# Patient Record
Sex: MALE | Race: WHITE | Employment: OTHER | URBAN - METROPOLITAN AREA
[De-identification: names, ages, dates, MRNs, and addresses within clinical notes are randomized per-mention and may not be internally consistent; named-entity substitution may affect disease eponyms.]

---

## 2018-10-09 ENCOUNTER — OFFICE VISIT (OUTPATIENT)
Dept: PHYSICAL MEDICINE AND REHAB | Age: 73
End: 2018-10-09
Payer: MEDICARE

## 2018-10-09 VITALS — HEIGHT: 72 IN | BODY MASS INDEX: 29.53 KG/M2 | WEIGHT: 218 LBS

## 2018-10-09 DIAGNOSIS — R20.2 NUMBNESS AND TINGLING OF BOTH LOWER EXTREMITIES: ICD-10-CM

## 2018-10-09 DIAGNOSIS — R20.0 NUMBNESS AND TINGLING OF BOTH LOWER EXTREMITIES: ICD-10-CM

## 2018-10-09 DIAGNOSIS — R29.898 WEAKNESS OF BOTH LOWER EXTREMITIES: ICD-10-CM

## 2018-10-09 DIAGNOSIS — M54.50 ACUTE BILATERAL LOW BACK PAIN WITHOUT SCIATICA: ICD-10-CM

## 2018-10-09 DIAGNOSIS — M54.50 ACUTE BILATERAL LOW BACK PAIN WITHOUT SCIATICA: Primary | ICD-10-CM

## 2018-10-09 PROCEDURE — 4040F PNEUMOC VAC/ADMIN/RCVD: CPT | Performed by: PHYSICAL MEDICINE & REHABILITATION

## 2018-10-09 PROCEDURE — 95886 MUSC TEST DONE W/N TEST COMP: CPT | Performed by: PHYSICAL MEDICINE & REHABILITATION

## 2018-10-09 PROCEDURE — 1123F ACP DISCUSS/DSCN MKR DOCD: CPT | Performed by: PHYSICAL MEDICINE & REHABILITATION

## 2018-10-09 PROCEDURE — 3017F COLORECTAL CA SCREEN DOC REV: CPT | Performed by: PHYSICAL MEDICINE & REHABILITATION

## 2018-10-09 PROCEDURE — 95910 NRV CNDJ TEST 7-8 STUDIES: CPT | Performed by: PHYSICAL MEDICINE & REHABILITATION

## 2018-10-09 PROCEDURE — G8419 CALC BMI OUT NRM PARAM NOF/U: HCPCS | Performed by: PHYSICAL MEDICINE & REHABILITATION

## 2018-10-09 PROCEDURE — 99202 OFFICE O/P NEW SF 15 MIN: CPT | Performed by: PHYSICAL MEDICINE & REHABILITATION

## 2018-10-09 PROCEDURE — G8484 FLU IMMUNIZE NO ADMIN: HCPCS | Performed by: PHYSICAL MEDICINE & REHABILITATION

## 2018-10-09 PROCEDURE — 4004F PT TOBACCO SCREEN RCVD TLK: CPT | Performed by: PHYSICAL MEDICINE & REHABILITATION

## 2018-10-09 PROCEDURE — 1101F PT FALLS ASSESS-DOCD LE1/YR: CPT | Performed by: PHYSICAL MEDICINE & REHABILITATION

## 2018-10-09 PROCEDURE — G8427 DOCREV CUR MEDS BY ELIG CLIN: HCPCS | Performed by: PHYSICAL MEDICINE & REHABILITATION

## 2018-10-09 RX ORDER — TAMSULOSIN HYDROCHLORIDE 0.4 MG/1
1 CAPSULE ORAL
COMMUNITY
Start: 2018-08-21 | End: 2018-10-09

## 2018-10-09 RX ORDER — OMEPRAZOLE 20 MG/1
1 CAPSULE, DELAYED RELEASE ORAL
COMMUNITY
Start: 2018-08-21

## 2018-10-09 RX ORDER — SILDENAFIL CITRATE 20 MG/1
TABLET ORAL
Status: ON HOLD | COMMUNITY
End: 2021-04-19

## 2018-10-09 RX ORDER — TAMSULOSIN HYDROCHLORIDE 0.4 MG/1
CAPSULE ORAL
Status: ON HOLD | COMMUNITY
End: 2021-04-19

## 2018-10-09 RX ORDER — IBUPROFEN 600 MG/1
TABLET ORAL
Status: ON HOLD | COMMUNITY
Start: 1998-03-20 | End: 2021-04-19

## 2018-10-09 RX ORDER — ATENOLOL 50 MG/1
50 TABLET ORAL 3 TIMES DAILY
COMMUNITY
Start: 2018-07-23

## 2018-10-09 NOTE — PROGRESS NOTES
Above Knee EDB 14.38 2.2 Above Knee - Fib head 10 44 32.5   L Peroneal - EDB      Ankle EDB 4.17 1.7* Ankle - EDB 8  32.1      Fib head EDB 13.02 0.7* Fib head - Ankle 37.5 42 32.2      Above Knee EDB 15.42 0.7* Above Knee - Fib head 10 42 32.2   R Tibial - AH      Ankle AH 4.43 7.1 Ankle - AH 8  32.4      Pop fossa AH 14.32 4.4 Pop fossa - Ankle 45 45 32.4   L Tibial - AH      Ankle AH 3.96 8.2 Ankle - AH 8  32.1      Pop fossa AH 14.11 5.6 Pop fossa - Ankle 42 41 32.1       F  Wave      Nerve Fmin % F    ms %   R Peroneal - EDB 54.58 20   R Tibial - AH 53.80 50   L Peroneal - EDB 58.80 10   L Tibial - AH 55.42 50       H Reflex      Nerve H Lat    ms   L Tibial - [place] 33.91   R Tibial - [place] 35.73     EMG      EMG Summary Table     Spontaneous MUAP Recruitment   Muscle Nerve Roots IA Fib PSW Fasc Amp Dur. PPP Pattern   L. Vastus medialis Femoral L2-L4 N None None None N N N N   L. Tibialis anterior Deep peroneal (Fibular) L4-L5 N None None None 2+ 2+ 3+ Discrete   L. Gastrocnemius (Medial head) Tibial S1-S2 N None None None N N N N   L. Extensor hallucis longus Deep peroneal (Fibular) L5-S1 N None None None 1+ 1+ 1+ Reduced   L. Lumbar paraspinals (low)  - N None None None N N N N   L. Lumbar paraspinals (mid)  - N None None None N N N N   L. Gluteus medius Superior gluteal L4-S1 N None None None 1+ 1+ 1+ Reduced   L. Gluteus francine Inferior gluteal L5-S2 N None None None N N N N          Pain was assessed/reassessed during EMG and managed with appropriate intervention throughout the entire procedure. The patient was instructed in post procedure care. Technical Considerations:  Obesity  No, Poor tolerance to test No, Skin callous No, Skin breakdown No, Edema No    Summary of Findings:   Nerve conduction studies:   · The following nerve conduction studies were abnormal:   · The peroneal motor study recording the extensor digitorum brevis was small in amplitude compared to the right.    · All other nerve conduction studies listed in the table above were normal in latency, amplitude and conduction velocity. Needle EMG:   ·  Needle EMG was performed using a concentric needle. · The following abnormalities were seen on needle EMG: Reduced recruitment of motor units with chronic changes of increased amplitude, duration and phases were seen in the left extensor hallucis longus, tibialis anterior and gluteus medius. All other muscles tested, as listed in the table above demonstrated normal  amplitude, duration, phases and recruitment and no active denervation signs were seen. Diagnostic Interpretation: This study was abnormal.     Electrodiagnosis: There is electrodiagnostic evidence of a left L5 radiculopathy. · Location: left    · Nature: [ X ] Axonal   [  ] Demyelinating  [  ] Mixed axonal and demyelinating     [  ] Sensory [ X ] Motor               [  ] Mixed sensorimotor     [  ] with active denervation       [email or web address]  ] without active denervation  · Duration: Chronic  · Severity: moderate  · Prognosis: Poor. The prognosis for recovery of axonal lesions is poor and dependant on collateral sprouting and reinnervation. Probable clinical diagnosis: Chronic left L5 radiculopathy    Comparison to prior EMG: There is not a prior EMG for comparison. Follow up EMG is recommended if clinically warranted. Thank you for the consultation and for allowing me to participate in the care of your patient. Technologist: Ann Rogers LPN, CNCT   Physician:    Padmini Arcos D.O., P.T. Board Certified Physical Medicine and Rehabilitation  Board Certified Electrodiagnostic Medicine      Normal nerve Conduction Values    For sensory nerve conduction studies, the amplitude is measured peak-to-peak, the latency reported is the distal peak latency, and the conduction velocity, if measured, is determined from the onset latencies and is over the forearm or leg respectively.       For motor nerve conduction studies, the amplitude is measured baseline-to-peak, the latency reported is the distal onset latency, the conduction velocity is calculated over the forearm or leg respectively. F waves are reported as minimal latency and are performed as follows: recording the abductor pollicis brevis stimulating the median nerve at the wrist, recording the abductor digiti minimi stimulating the ulnar nerve at the wrist, recording at the extensor digitorum brevis stimulating the peroneal nerve at the ankle and recording the abductor hallucis stimulating tibial at the medial malleolus. For H-Reflexes the site of stimulation is in the popliteal fossa and the recording site is the gastrocnemius. For F waves, the site of stimulation is at the wrist or ankle respectively and the muscle recorded is the same as that used for the compound motor unit action potentials as described in the table.           Sensory Nerves Peak to Peak  Amplitude  (mV) Peak Latency (ms)   Superficial Radial Sensory Antidromic (10cm) 11 2.8    Median Sensory Antidromic Dig II   Palm (7cm)  Wrist (14 cm)  Age 19-49 BMI <24  Age 47-78 BMI <24  Age 20-48 BMI >/= 24  Age 47-78 BMI >/= 24   8  13  19  15  13  8   2.3  4     Ulnar Sensory Antidromic Dig V (14 cm)  Age 20-48 BMI <24  Age 47-78 BMI <24  Age 20-48 BMI >/= 24  Age 47-78 BMI >/= 24 9  13  13  8  4 4   Medial Antebrachial cutaneous Sensory Antidromic (10 cm)   3 2.6   Lateral Antebrachial cutaneous Sensory Antidromic (10 cm) 6 2.5   Sural Sensory Antidromic (14 cm) 4 4.5     Medial and lateral Plantars (14 cm)   Compare side to side <3.8     Superficial peroneal sensory (10 cm)  Age <9  Age 7-34  Age 35-46  Age 52-63  Age >57   >6  >6  >5  >4  >3   <4.3  <4.4  <4.5  <4.6  <4.6     Saphenous sensory (12 cm)  Age <9  Age 7-34  Age 35-46  Age 52-63  Age >57   >6  >6  >4  >4  >3   <4.3  <4.4  <4.5  <4.6  <4.6     Dorsal ulnar sensory (8 cm)  Age <9  Age 7-34  Age 35-46  Age 52-63  Age >57 >18  >18  >12  >10  >5   <2.9  <3  <3.1  <3.1  <3.2         Study Latency difference (ms)   Median compared to (minus) ulnar motor comparison  All ages  Age 20-48  Age 47-78   1.5  1.4  1.7   Median to Ulnar comparison (second lumbrical/interossei)  0.4     Combined Sensory Index:   Study Latency Difference (ms)</=   Median to Ulnar Palmar Orthodromic mixed nerve comparison 0.3   Median to Ulnar sensory Ring finger comparison 0.4       Median: Radial sensory digit 1 comparison 0.5     Combined Sensory Index (CSI)  0.9       Motor Nerves Baseline to Peak Amplitude  (mV) Conduction Velocity (m/s) Distal Latency (ms)   Median motor APB  All ages  Men Age23 to 44  Men Age 36 to 52  Men Age 48 to 61  Men Age 61 to 71  Men age 79 to 78  Women Age 23 to 44  Women Age 36 to 52  Women Age 48 to 61  Women Age 61 to 71  Women Age 79 to 78   4.1  5.9  4.2   4.2   3.8  3.8  5.9  4.2  4.2  3.8  3.8   49  49  47  47  47  47  53  51  51  51  51   4.5  4.6  4.6  4.7  4.7  4.7  4.4  4.4  4.4  4.4  4.4   Ulnar motor ADM  All ages  Below elbow  Across elbow  Above elbow  CV drop across elbow  % CV drop across elbow   7.9     52  43  50  15 m/s  23%   3.7   Fibular (peroneal) motor EDB  All ages  Age 23 to 44 <170 cm tall   Age 23 to 44 >170 cm tall  Age 36 to 78 <170 cm tall  Age 36 to 78 >170 cm tall  CV across fibular head  CV drop across fibular head  % CV drop across fibular head  % amplitude drop ankle to below fibula  % amplitude drop across fibular head   1.3  2.6  2.6  1.1  1.1        32%  25%   38  43  37  39  36  42  6m/s  12%     6.5  6.5  6.5  6.5  6.5   Tibial motor AH  All ages  Age 23 to 34 <160 cm tall  Age 30-49 <160 cm tall  Age 48 to 61 <160 cm tall  Age 61 to 78 <160 cm tall  Age 23 to 34, 160-170 cm tall  Age 30-49 160-170 cm tall  Age 48 to 61 160-170 cm tall  Age 61 to 78 160-170 cm tall  Age 23 to 34 >/=170 cm tall  Age 30-49 >/=170 cm tall  Age 48 to 61 >/=170 cm tall  Age 61 to 78 >/=170 cm tall  Amplitude

## 2018-10-09 NOTE — PROGRESS NOTES
Date of Examination: 10/09/18  Patient Name: Arminda Roblero  is a 72y.o. year old male who was seen due to complaints of   Chief Complaint   Patient presents with    Back Pain     Right Low back pain without radiation     Numbness     Left foot numbness    Extremity Weakness     Intermittent weakness in the lower extremities    that has been present for many years and started after lumbar spine surgery. Physical Exam: MSK: There is no joint effusion, deformity, instability, swelling, erythema or warmth. AROM is full in the spine and extremities. Negative SLR. Neurologic:  4/5 left ankle dorsiflexion and great toe extension, decreased light touch sensation left dorsal foot, otherwise, no focal sensorimotor deficit. Reflexes 2+ and symmetric. Gait is normal.    Impression:   1. Acute bilateral low back pain without sciatica    2. Weakness of both lower extremities    3. Numbness and tingling of both lower extremities        Plan:   · EMG is indicated to evaluate the above diagnosis. Orders Placed This Encounter   Procedures    EMG     Standing Status:   Future     Standing Expiration Date:   10/9/2019     · EMG was done today and showed a chronic left L5 radiculopathy. The patient was educated about the diagnosis and the prognosis. · Advised patient to follow up with referring provider. Thank you for allowing me to participate in the care of your patient.       Sincerely,     Sherry Calixto

## 2021-04-19 ENCOUNTER — HOSPITAL ENCOUNTER (INPATIENT)
Age: 76
LOS: 1 days | Discharge: HOME | DRG: 069 | End: 2021-04-20
Attending: INTERNAL MEDICINE | Admitting: INTERNAL MEDICINE
Payer: MEDICARE

## 2021-04-19 PROBLEM — G93.40 ACUTE ENCEPHALOPATHY: Status: ACTIVE | Noted: 2021-04-19

## 2021-04-19 PROCEDURE — G0379 DIRECT REFER HOSPITAL OBSERV: HCPCS

## 2021-04-19 PROCEDURE — G0378 HOSPITAL OBSERVATION PER HR: HCPCS

## 2021-04-19 RX ORDER — NAPROXEN 500 MG/1
500 TABLET ORAL 2 TIMES DAILY WITH MEALS
COMMUNITY

## 2021-04-19 RX ORDER — ROSUVASTATIN CALCIUM 5 MG/1
5 TABLET, COATED ORAL DAILY
COMMUNITY

## 2021-04-19 ASSESSMENT — PAIN SCALES - GENERAL: PAINLEVEL_OUTOF10: 0

## 2021-04-20 ENCOUNTER — APPOINTMENT (OUTPATIENT)
Dept: ULTRASOUND IMAGING | Age: 76
DRG: 069 | End: 2021-04-20
Attending: INTERNAL MEDICINE
Payer: MEDICARE

## 2021-04-20 VITALS
DIASTOLIC BLOOD PRESSURE: 82 MMHG | TEMPERATURE: 97.5 F | HEART RATE: 60 BPM | RESPIRATION RATE: 18 BRPM | OXYGEN SATURATION: 96 % | SYSTOLIC BLOOD PRESSURE: 147 MMHG | HEIGHT: 72 IN | BODY MASS INDEX: 29.57 KG/M2

## 2021-04-20 LAB
CHOLESTEROL, TOTAL: 202 MG/DL (ref 0–199)
HBA1C MFR BLD: 5.3 % (ref 4–5.6)
HCT VFR BLD CALC: 40.1 % (ref 37–54)
HDLC SERPL-MCNC: 67 MG/DL
HEMOGLOBIN: 13.5 G/DL (ref 12.5–16.5)
LDL CHOLESTEROL CALCULATED: 115 MG/DL (ref 0–99)
MCH RBC QN AUTO: 32 PG (ref 26–35)
MCHC RBC AUTO-ENTMCNC: 33.7 % (ref 32–34.5)
MCV RBC AUTO: 95 FL (ref 80–99.9)
PDW BLD-RTO: 12.7 FL (ref 11.5–15)
PLATELET # BLD: 211 E9/L (ref 130–450)
PMV BLD AUTO: 10 FL (ref 7–12)
RBC # BLD: 4.22 E12/L (ref 3.8–5.8)
TRIGL SERPL-MCNC: 99 MG/DL (ref 0–149)
TROPONIN: <0.01 NG/ML (ref 0–0.03)
TROPONIN: <0.01 NG/ML (ref 0–0.03)
VLDLC SERPL CALC-MCNC: 20 MG/DL
WBC # BLD: 6.8 E9/L (ref 4.5–11.5)

## 2021-04-20 PROCEDURE — 85027 COMPLETE CBC AUTOMATED: CPT

## 2021-04-20 PROCEDURE — 2060000000 HC ICU INTERMEDIATE R&B

## 2021-04-20 PROCEDURE — 80061 LIPID PANEL: CPT

## 2021-04-20 PROCEDURE — 84484 ASSAY OF TROPONIN QUANT: CPT

## 2021-04-20 PROCEDURE — 93880 EXTRACRANIAL BILAT STUDY: CPT

## 2021-04-20 PROCEDURE — 6360000002 HC RX W HCPCS: Performed by: FAMILY MEDICINE

## 2021-04-20 PROCEDURE — 92610 EVALUATE SWALLOWING FUNCTION: CPT

## 2021-04-20 PROCEDURE — 2580000003 HC RX 258: Performed by: FAMILY MEDICINE

## 2021-04-20 PROCEDURE — 92523 SPEECH SOUND LANG COMPREHEN: CPT

## 2021-04-20 PROCEDURE — 93880 EXTRACRANIAL BILAT STUDY: CPT | Performed by: RADIOLOGY

## 2021-04-20 PROCEDURE — 83036 HEMOGLOBIN GLYCOSYLATED A1C: CPT

## 2021-04-20 PROCEDURE — 97161 PT EVAL LOW COMPLEX 20 MIN: CPT

## 2021-04-20 PROCEDURE — 6370000000 HC RX 637 (ALT 250 FOR IP): Performed by: FAMILY MEDICINE

## 2021-04-20 PROCEDURE — 36415 COLL VENOUS BLD VENIPUNCTURE: CPT

## 2021-04-20 PROCEDURE — 96372 THER/PROPH/DIAG INJ SC/IM: CPT

## 2021-04-20 RX ORDER — LORAZEPAM 2 MG/ML
3 INJECTION INTRAMUSCULAR
Status: DISCONTINUED | OUTPATIENT
Start: 2021-04-20 | End: 2021-04-21 | Stop reason: HOSPADM

## 2021-04-20 RX ORDER — ASPIRIN 81 MG/1
81 TABLET ORAL DAILY
Status: DISCONTINUED | OUTPATIENT
Start: 2021-04-20 | End: 2021-04-21 | Stop reason: HOSPADM

## 2021-04-20 RX ORDER — SODIUM CHLORIDE 0.9 % (FLUSH) 0.9 %
5-40 SYRINGE (ML) INJECTION EVERY 12 HOURS SCHEDULED
Status: DISCONTINUED | OUTPATIENT
Start: 2021-04-20 | End: 2021-04-21 | Stop reason: HOSPADM

## 2021-04-20 RX ORDER — SODIUM CHLORIDE 0.9 % (FLUSH) 0.9 %
5-40 SYRINGE (ML) INJECTION PRN
Status: DISCONTINUED | OUTPATIENT
Start: 2021-04-20 | End: 2021-04-21 | Stop reason: HOSPADM

## 2021-04-20 RX ORDER — LORAZEPAM 2 MG/ML
2 INJECTION INTRAMUSCULAR
Status: DISCONTINUED | OUTPATIENT
Start: 2021-04-20 | End: 2021-04-21 | Stop reason: HOSPADM

## 2021-04-20 RX ORDER — LORAZEPAM 1 MG/1
3 TABLET ORAL
Status: DISCONTINUED | OUTPATIENT
Start: 2021-04-20 | End: 2021-04-21 | Stop reason: HOSPADM

## 2021-04-20 RX ORDER — POLYETHYLENE GLYCOL 3350 17 G/17G
17 POWDER, FOR SOLUTION ORAL DAILY PRN
Status: DISCONTINUED | OUTPATIENT
Start: 2021-04-20 | End: 2021-04-21 | Stop reason: HOSPADM

## 2021-04-20 RX ORDER — SODIUM CHLORIDE 9 MG/ML
25 INJECTION, SOLUTION INTRAVENOUS PRN
Status: DISCONTINUED | OUTPATIENT
Start: 2021-04-20 | End: 2021-04-21 | Stop reason: HOSPADM

## 2021-04-20 RX ORDER — LORAZEPAM 2 MG/ML
4 INJECTION INTRAMUSCULAR
Status: DISCONTINUED | OUTPATIENT
Start: 2021-04-20 | End: 2021-04-21 | Stop reason: HOSPADM

## 2021-04-20 RX ORDER — ASPIRIN 81 MG/1
81 TABLET ORAL DAILY
COMMUNITY

## 2021-04-20 RX ORDER — LORAZEPAM 1 MG/1
4 TABLET ORAL
Status: DISCONTINUED | OUTPATIENT
Start: 2021-04-20 | End: 2021-04-21 | Stop reason: HOSPADM

## 2021-04-20 RX ORDER — ROSUVASTATIN CALCIUM 5 MG/1
5 TABLET, COATED ORAL DAILY
Status: DISCONTINUED | OUTPATIENT
Start: 2021-04-20 | End: 2021-04-21 | Stop reason: HOSPADM

## 2021-04-20 RX ORDER — ONDANSETRON 2 MG/ML
4 INJECTION INTRAMUSCULAR; INTRAVENOUS EVERY 6 HOURS PRN
Status: DISCONTINUED | OUTPATIENT
Start: 2021-04-20 | End: 2021-04-21 | Stop reason: HOSPADM

## 2021-04-20 RX ORDER — LORAZEPAM 1 MG/1
1 TABLET ORAL
Status: DISCONTINUED | OUTPATIENT
Start: 2021-04-20 | End: 2021-04-21 | Stop reason: HOSPADM

## 2021-04-20 RX ORDER — ATENOLOL 50 MG/1
50 TABLET ORAL DAILY
Status: DISCONTINUED | OUTPATIENT
Start: 2021-04-20 | End: 2021-04-21 | Stop reason: HOSPADM

## 2021-04-20 RX ORDER — PROMETHAZINE HYDROCHLORIDE 25 MG/1
12.5 TABLET ORAL EVERY 6 HOURS PRN
Status: DISCONTINUED | OUTPATIENT
Start: 2021-04-20 | End: 2021-04-21 | Stop reason: HOSPADM

## 2021-04-20 RX ORDER — ASPIRIN 300 MG/1
300 SUPPOSITORY RECTAL DAILY
Status: DISCONTINUED | OUTPATIENT
Start: 2021-04-20 | End: 2021-04-21 | Stop reason: HOSPADM

## 2021-04-20 RX ORDER — LORAZEPAM 1 MG/1
2 TABLET ORAL
Status: DISCONTINUED | OUTPATIENT
Start: 2021-04-20 | End: 2021-04-21 | Stop reason: HOSPADM

## 2021-04-20 RX ORDER — LORAZEPAM 2 MG/ML
1 INJECTION INTRAMUSCULAR
Status: DISCONTINUED | OUTPATIENT
Start: 2021-04-20 | End: 2021-04-21 | Stop reason: HOSPADM

## 2021-04-20 RX ADMIN — ENOXAPARIN SODIUM 40 MG: 40 INJECTION SUBCUTANEOUS at 09:28

## 2021-04-20 RX ADMIN — Medication 10 ML: at 09:00

## 2021-04-20 RX ADMIN — ATENOLOL 50 MG: 50 TABLET ORAL at 09:28

## 2021-04-20 RX ADMIN — ROSUVASTATIN 5 MG: 10 TABLET, FILM COATED ORAL at 09:28

## 2021-04-20 ASSESSMENT — PAIN SCALES - GENERAL
PAINLEVEL_OUTOF10: 0

## 2021-04-20 NOTE — H&P
Hospital Medicine History & Physical      PCP: Nafisa Crouch MD    Date of Admission: 4/19/2021    Date of Service: Pt seen/examined on 4/20/2021  and Admitted to Inpatient with expected LOS greater than two midnights due to medical therapy. Chief Complaint:  AMS       History Of Present Illness:   78 y.o. male with past medical history of hypertension and hyperlipidemia. Patient is a transfer from [facility] ED He presented to the Ed there due to increased confusion and lightheadedness. [facility] and started to developed  dizziness . He states that it felt like the room was spinning. He reports no vision changes. He denies any ear pain . The patient denies slurred speech. He reports no chest pain or shortness of breath  . He reports no fever chills or coughing It was reported by EMS that patient was confused and had difficulty talking . In the ED at [address] head revealed communicating hydrencephalus no acute infract . MRI head revealed possible cortical infarct in left parietal lobe Trop was <0.015 Lactic acid 0.7 creatinine was 0.81 EKG was normal sinus rhythm . Past Medical History:      Hypertension   Hyperlipidemia       Past Surgical History:    Reviewed in chart     Medications Prior to Admission:      Prior to Admission medications    Medication Sig Start Date End Date Taking?  Authorizing Provider   aspirin 81 MG EC tablet Take 81 mg by mouth daily   Yes Historical Provider, MD   rosuvastatin (CRESTOR) 5 MG tablet Take 5 mg by mouth daily   Yes Historical Provider, MD   naproxen (NAPROSYN) 500 MG tablet Take 500 mg by mouth 2 times daily (with meals)   Yes Historical Provider, MD   atenolol (TENORMIN) 50 MG tablet 50 mg 3 times daily  7/23/18  Yes Historical Provider, MD   omeprazole (PRILOSEC) 20 MG delayed release capsule 1 capsule 8/21/18  Yes Historical Provider, MD   Cholecalciferol 2000 units CAPS Vitamin D3 2,000 unit tablet   Take 1 tablet every day by oral route for 90 days. Yes Historical Provider, MD       Allergies:  Patient has no known allergies. Social History:      The patient currently lives with family     TOBACCO:   reports that he has never smoked. His smokeless tobacco use includes snuff. ETOH:   reports current alcohol use of about 52.0 standard drinks of alcohol per week. Family History:       Reviewed in detail and negative for DM, CAD, Cancer, CVA. Positive as follows:    No family history on file. REVIEW OF SYSTEMS:   Pertinent positives as noted in the HPI. All other systems reviewed and negative. PHYSICAL EXAM:    BP (!) 143/80   Pulse 63   Temp 98 °F (36.7 °C) (Oral)   Resp 19   SpO2 97%     General appearance:  No apparent distress, appears stated age and cooperative. HEENT:  Normal cephalic, atraumatic without obvious deformity. Pupils equal, round, and reactive to light. Extra ocular muscles intact. Conjunctivae/corneas clear. Neck: Supple, with full range of motion. No jugular venous distention. Trachea midline. Respiratory:  Normal respiratory effort. Clear to auscultation, bilaterally without Rales/Wheezes/Rhonchi. Cardiovascular:  Regular rate and rhythm with normal S1/S2 without murmurs, rubs or gallops. Abdomen: Soft, non-tender, non-distended with normal bowel sounds. Musculoskeletal:  No clubbing, cyanosis or edema bilaterally. Full range of motion without deformity. Skin: Skin color, texture, turgor normal.  No rashes or lesions. Neurologic:  No slurred speech 5/5 strength in upper land lower extremities  Neurovascularly intact without any focal sensory/motor deficits. Cranial nerves: II-XII intact, grossly non-focal.  Psychiatric:  Alert and oriented x2 , thought content appropriate, normal insight      EKG:  I have reviewed the EKG  Labs:     No results for input(s): WBC, HGB, HCT, PLT in the last 72 hours.   No results for input(s): NA, K, CL, CO2, BUN, CREATININE, CALCIUM, PHOS in the last 72 hours. Invalid input(s): MAGNES  No results for input(s): AST, ALT, BILIDIR, BILITOT, ALKPHOS in the last 72 hours. No results for input(s): INR in the last 72 hours. Recent Labs     04/20/21  0124   TROPONINI <0.01       Urinalysis:    No results found for: NITRU, WBCUA, BACTERIA, RBCUA, BLOODU, SPECGRAV, GLUCOSEU      ASSESSMENT:    Active Hospital Problems    Diagnosis Date Noted    Acute encephalopathy [G93.40] 04/19/2021       PLAN:      Acute Encephalopathy : maybe  likely due to  CVA . MRI revealed small remote cortical infarct in left parietal lobe . CT head revealed communicating hydrocephalus .  Admit inpatient vitals telemetry , NPO till swallow evaluation PT/OT Neurology consult carotid ultrsound trend troponin ASA , Crestor     Hypertension : blood pressure is stable C/w Atenolol     Hyperlipidemia : C/w Crestor            DVT Prophylaxis: Lovenox   Diet: Diet NPO Effective Now  Code Status: Full Code         Lexie Marcial MD

## 2021-04-20 NOTE — PROGRESS NOTES
Patient requested to sign out Against medical advice. Patient was counseled by care team with no success. AMA paper signed and patient left with wife.

## 2021-04-20 NOTE — PROGRESS NOTES
SPEECH/LANGUAGE PATHOLOGY  BEDSIDE SWALLOWING EVALUATION    PATIENT NAME:  Greg Hanks      :  1945          TODAY'S DATE:  2021 ROOM:  [address] [place] [address]      SUMMARY OF EVALUATION     DYSPHAGIA DIAGNOSIS:  Within functional limits      DIET RECOMMENDATIONS: Regular consistency solids with  thin liquids     FEEDING RECOMMENDATIONS:     Assistance level:  no assistance needed      Compensatory strategies recommended:compensatory strategies are not recommended at this time    PLAN OF CARE     Dysphagia therapy is not recommended                  PROCEDURE     Consistencies Administered During the Evaluation   Liquids: Thin   Solids:  Pureed, solids      Method of Intake:   Straw, spoon  Self fed, fed by clinician    Position:   Upright seated                  RESULTS     Oral Stage: The oral stage of swallowing was within functional limits      Pharyngeal Stage:      No signs of aspiration were noted during this evaluation however, silent aspiration cannot be ruled out at bedside. If silent aspiration is suspected, a Videofluoroscopic Study of Swallowing (MBS) is recommended and requires a physician order. The Speech Language Pathologist (SLP) completed education with the patient regarding results of evaluation. Explained that Speech Pathology intervention is not warranted at this time      CPT code:  91169  bedside swallow eval      [x]The admitting diagnosis and active problem list, as listed below have been reviewed prior to initiation of this evaluation.      ADMITTING DIAGNOSIS: Acute encephalopathy [G93.40]     ACTIVE PROBLEM LIST:   Patient Active Problem List   Diagnosis    Acute encephalopathy

## 2021-04-20 NOTE — CARE COORDINATION
Met with patient at bedside to discuss transition of care. Pt lives with his wife in a 2 story home with 2 stairs to enter. Bedroom and bathroom on 2nd floor. No assistive devices. He stated he is independent with ADL's. He was driving pta. PCP . Pt planning to return home at discharge with no anticipated needs. Awaiting Neuro and NS consults. PT [facility] 24/24.  CM to follow    Marlene PEÑAN, RN  [facility] Case Management  [phone]

## 2021-04-20 NOTE — PROGRESS NOTES
SPEECH/LANGUAGE PATHOLOGY  SPEECH/LANGUAGE/COGNITIVE EVALUATION      PATIENT NAME:  Nacho Clements      :  1945          TODAY'S DATE:  2021 ROOM:  [address] [place] [address]      SPEECH PATHOLOGY DIAGNOSIS:   Within Functional Limits    PLAN OF CARE:  Speech Pathology intervention is not warranted at this time. MOTOR SPEECH          Oral Peripheral Examination   Adequate lingual/labial strength       Parameters of Speech Production  Respiration:  Within Function Limits  Articulation:  Within Function Limits  Resonance:  Within Function Limits  Quality:   Within Function Limits  Pitch: Within Function Limits  Intensity: Within Function Limits  Fluency:  Intact  Prosody Intact      RECEPTIVE LANGUAGE       Comprehension of Yes/No Questions: Within Function Limits    Process  Simple Verbal Commands: Within Function Limits  Process Intermediate Verbal Commands: Within Function Limits  Process Complex Verbal Commands: Within Function Limits    Comprehension of Conversation: Within Function Limits      EXPRESSIVE LANGUAGE       Serials: Functional    Imitation:  Words   Functional  Sentences Functional    Naming:  (Modality used: Verbal )   Confrontation Naming Functional  Functional Description Functional  Response Naming: Functional    Conversation:     Grammatical form:  Intact       COGNITION     Attention/Orientation  Attention: Sustained attention  Oriented to:  Person, Place, Date, Reason  for hospitalization    Memory   Biographical: information.   Recalled  Address,  Birthdate, Age,   Family   Delayed recall: 2/3 words    Organization/Problem Solving/Reasoning   Verbal Sequencing:  Functional      Verbal Problem solving:  Functional        CLINICAL OBSERVATIONS NOTED DURING THE EVALUATION    Within Functional Limits                    CPT code:    04692  eval speech sound lang comprehension      [x]The admitting diagnosis and active problem list, as listed below have been reviewed prior to initiation of this evaluation.      ADMITTING DIAGNOSIS: Acute encephalopathy [G93.40]     ACTIVE PROBLEM LIST:   Patient Active Problem List   Diagnosis    Acute encephalopathy

## 2021-04-20 NOTE — PROGRESS NOTES
Physical Therapy    Physical Therapy Initial Assessment     Name: Treva Dye  : 1945  MRN: [id number]    Referring Provider:  Joaqium Godoy MD    Date of Service: 2021    Evaluating PT:  Corina Kam PT, DPT [id number]     Room #:  4020/3394-B  Diagnosis:  Acute encephalopathy   PMHx/PSHx:  No past medical history on file   Precautions:  Low fall risk, [place]  Equipment Needs:  NA    SUBJECTIVE:    Pt lives with his wife in a 2 story home with 2 stairs to enter and 1 rail. Bedroom and bathroom are on the 2nd level. There are 13 steps with B rails to 2nd level. Pt ambulated with no AD, independent, and drives PTA. OBJECTIVE:   Initial Evaluation  Date: 21 Treatment Short Term/ Long Term   Goals   AM-PAC 6 Clicks      Was pt agreeable to Eval/treatment? Yes     Does pt have pain? No c/o pain      Bed Mobility  Rolling: NT  Supine to sit: NT  Sit to supine: NT  Scooting: NT     Transfers Sit to stand: Independent   Stand to sit: Independent   Stand pivot: Independent      Ambulation    200 feet with no AD Independent      Stair negotiation: ascended and descended  12 steps with 1 rail Modified Independent       ROM BUE:  WFL  BLE:  WFL     Strength BUE:  NT  BLE:  5/5     Balance Sitting EOB:  Independent   Dynamic Standing:  Independent        Pt is A & O x 4  Sensation:  Pt denies numbness and tingling to extremities  Edema:  Unremarkable     Vitals:  HR WNL SpO2 WNL    Therapeutic Exercises:     BLE ROM    Patient education  Pt educated on safety during functional mobility, PT role     Patient response to education:   Pt verbalized understanding Pt demonstrated skill Pt requires further education in this area   Yes  Yes  no     ASSESSMENT:    Comments:  Pt received sitting EOB and agreeable to PT evaluation. Vitals monitored during session. Pt demonstrates good strength and equal side to side. Pt ambulates with fair gait speed and no LOB.   Pt able to complete full set of stairs in the stairwell without difficulty. Returned to room. Pt left  with call button in reach, lines attached, and needs met. Treatment:  Patient practiced and was instructed in the following treatment:     None     Pt's/ family goals   1. Home     Patient and or family understand(s) diagnosis, prognosis, and plan of care. Yes     PLAN:  No acute PT needs identified during functional assessment. PT will be discontinued. Please re-consult as needed. Time in  0930  Time out  0944    Total Treatment Time  0 minutes     Evaluation Time includes thorough review of current medical information, gathering information on past medical history/social history and prior level of function, completion of standardized testing/informal observation of tasks, assessment of data and education on plan of care and goals.     CPT codes:  [x] Low Complexity PT evaluation 76707  [] Moderate Complexity PT evaluation 93191  [] High Complexity PT evaluation 11514  [] PT Re-evaluation 97840  [] Gait training 30682 -- minutes  [] Manual therapy [id number] -- minutes  [] Therapeutic activities 92519 -- minutes  [] Therapeutic exercises 41097 -- minutes  [] Neuromuscular reeducation 40833 -- minutes     Adriana Romo, PT, DPT  [id number]

## 2021-04-20 NOTE — CONSULTS
duplex bilateral    (Results Pending)          ASSESSMENT:    73-year-old male with history of remote left parietal lobe infarct presenting with new onset confusion and gait instability concerning for possible TIA given his other comorbidities versus possible presyncope or early alcohol withdrawal, though less likely. PLAN:    · Recommend outpatient follow-up with stroke clinic  · Discussed with patient need for further outpatient cardiac work-up including echocardiogram and possible 30-day event recorder  · Continue ASA and Crestor  · Recommend cessation of alcohol use  · Consider thiamine and folic acid supplementation for chronic alcoholism      Case discussed on rounds with Dr. Maxwell Arauz. Electronically signed by:  Deena Yu MD, 4/20/2021 11:02 AM

## 2021-05-10 NOTE — DISCHARGE SUMMARY
Pt admitted for acute encephalopathy thought to be due to CVA. On 4/20 however patient left AMA before I could see him.

## 2023-01-04 ENCOUNTER — TELEPHONE (OUTPATIENT)
Dept: NON INVASIVE DIAGNOSTICS | Age: 78
End: 2023-01-04

## 2023-01-05 ENCOUNTER — ANESTHESIA EVENT (OUTPATIENT)
Dept: CARDIAC CATH/INVASIVE PROCEDURES | Age: 78
End: 2023-01-05

## 2023-01-05 ENCOUNTER — ANESTHESIA (OUTPATIENT)
Dept: CARDIAC CATH/INVASIVE PROCEDURES | Age: 78
End: 2023-01-05

## 2023-01-05 ENCOUNTER — HOSPITAL ENCOUNTER (OUTPATIENT)
Dept: CARDIAC CATH/INVASIVE PROCEDURES | Age: 78
Discharge: HOME | End: 2023-01-05
Payer: MEDICARE

## 2023-01-05 VITALS
OXYGEN SATURATION: 96 % | WEIGHT: 220 LBS | DIASTOLIC BLOOD PRESSURE: 86 MMHG | BODY MASS INDEX: 29.8 KG/M2 | HEART RATE: 87 BPM | RESPIRATION RATE: 18 BRPM | HEIGHT: 72 IN | SYSTOLIC BLOOD PRESSURE: 149 MMHG | TEMPERATURE: 97.6 F

## 2023-01-05 DIAGNOSIS — I48.0 PAROXYSMAL ATRIAL FIBRILLATION (HCC): ICD-10-CM

## 2023-01-05 LAB
ANION GAP SERPL CALCULATED.3IONS-SCNC: 13 MMOL/L (ref 7–16)
BASOPHILS ABSOLUTE: 0.03 E9/L (ref 0–0.2)
BASOPHILS RELATIVE PERCENT: 0.4 % (ref 0–2)
BUN BLDV-MCNC: 15 MG/DL (ref 6–23)
CALCIUM SERPL-MCNC: 10.3 MG/DL (ref 8.6–10.2)
CHLORIDE BLD-SCNC: 98 MMOL/L (ref 98–107)
CO2: 25 MMOL/L (ref 22–29)
CREAT SERPL-MCNC: 0.9 MG/DL (ref 0.7–1.2)
EOSINOPHILS ABSOLUTE: 0.14 E9/L (ref 0.05–0.5)
EOSINOPHILS RELATIVE PERCENT: 2 % (ref 0–6)
GFR SERPL CREATININE-BSD FRML MDRD: >60 ML/MIN/1.73
GLUCOSE BLD-MCNC: 114 MG/DL (ref 74–99)
HCT VFR BLD CALC: 39.1 % (ref 37–54)
HEMOGLOBIN: 12.9 G/DL (ref 12.5–16.5)
IMMATURE GRANULOCYTES #: 0.05 E9/L
IMMATURE GRANULOCYTES %: 0.7 % (ref 0–5)
LYMPHOCYTES ABSOLUTE: 1.47 E9/L (ref 1.5–4)
LYMPHOCYTES RELATIVE PERCENT: 20.9 % (ref 20–42)
MAGNESIUM: 1.7 MG/DL (ref 1.6–2.6)
MCH RBC QN AUTO: 31.7 PG (ref 26–35)
MCHC RBC AUTO-ENTMCNC: 33 % (ref 32–34.5)
MCV RBC AUTO: 96.1 FL (ref 80–99.9)
MONOCYTES ABSOLUTE: 0.79 E9/L (ref 0.1–0.95)
MONOCYTES RELATIVE PERCENT: 11.3 % (ref 2–12)
NEUTROPHILS ABSOLUTE: 4.54 E9/L (ref 1.8–7.3)
NEUTROPHILS RELATIVE PERCENT: 64.7 % (ref 43–80)
PDW BLD-RTO: 13.1 FL (ref 11.5–15)
PLATELET # BLD: 287 E9/L (ref 130–450)
PMV BLD AUTO: 10.7 FL (ref 7–12)
POTASSIUM SERPL-SCNC: 4.3 MMOL/L (ref 3.5–5)
RBC # BLD: 4.07 E12/L (ref 3.8–5.8)
SODIUM BLD-SCNC: 136 MMOL/L (ref 132–146)
WBC # BLD: 7 E9/L (ref 4.5–11.5)

## 2023-01-05 PROCEDURE — 83735 ASSAY OF MAGNESIUM: CPT

## 2023-01-05 PROCEDURE — 2580000003 HC RX 258: Performed by: INTERNAL MEDICINE

## 2023-01-05 PROCEDURE — 6360000002 HC RX W HCPCS

## 2023-01-05 PROCEDURE — 3700000000 HC ANESTHESIA ATTENDED CARE

## 2023-01-05 PROCEDURE — 80048 BASIC METABOLIC PNL TOTAL CA: CPT

## 2023-01-05 PROCEDURE — 92960 CARDIOVERSION ELECTRIC EXT: CPT

## 2023-01-05 PROCEDURE — 92960 CARDIOVERSION ELECTRIC EXT: CPT | Performed by: INTERNAL MEDICINE

## 2023-01-05 PROCEDURE — 2709999900 HC NON-CHARGEABLE SUPPLY

## 2023-01-05 PROCEDURE — 6370000000 HC RX 637 (ALT 250 FOR IP): Performed by: INTERNAL MEDICINE

## 2023-01-05 PROCEDURE — 85025 COMPLETE CBC W/AUTO DIFF WBC: CPT

## 2023-01-05 RX ORDER — PROPOFOL 10 MG/ML
INJECTION, EMULSION INTRAVENOUS PRN
Status: DISCONTINUED | OUTPATIENT
Start: 2023-01-05 | End: 2023-01-05 | Stop reason: SDUPTHER

## 2023-01-05 RX ORDER — LANOLIN ALCOHOL/MO/W.PET/CERES
400 CREAM (GRAM) TOPICAL DAILY
Status: DISCONTINUED | OUTPATIENT
Start: 2023-01-05 | End: 2023-01-06 | Stop reason: HOSPADM

## 2023-01-05 RX ORDER — FUROSEMIDE 20 MG/1
20 TABLET ORAL DAILY
Qty: 7 TABLET | Refills: 0 | Status: SHIPPED | OUTPATIENT
Start: 2023-01-05

## 2023-01-05 RX ORDER — POTASSIUM CHLORIDE 750 MG/1
10 TABLET, EXTENDED RELEASE ORAL DAILY
Qty: 7 TABLET | Refills: 0 | Status: SHIPPED | OUTPATIENT
Start: 2023-01-05

## 2023-01-05 RX ORDER — SODIUM CHLORIDE 9 MG/ML
INJECTION, SOLUTION INTRAVENOUS ONCE
Status: COMPLETED | OUTPATIENT
Start: 2023-01-05 | End: 2023-01-05

## 2023-01-05 RX ORDER — FUROSEMIDE 10 MG/ML
INJECTION INTRAMUSCULAR; INTRAVENOUS PRN
Status: DISCONTINUED | OUTPATIENT
Start: 2023-01-05 | End: 2023-01-05 | Stop reason: SDUPTHER

## 2023-01-05 RX ORDER — POTASSIUM CHLORIDE 20 MEQ/1
40 TABLET, EXTENDED RELEASE ORAL ONCE
Status: COMPLETED | OUTPATIENT
Start: 2023-01-05 | End: 2023-01-05

## 2023-01-05 RX ORDER — CALCIUM CARBONATE/VITAMIN D3 500-10/5ML
1 LIQUID (ML) ORAL DAILY
Qty: 90 CAPSULE | Refills: 3 | Status: SHIPPED | OUTPATIENT
Start: 2023-01-05

## 2023-01-05 RX ADMIN — MAGNESIUM OXIDE 400 MG (241.3 MG MAGNESIUM) TABLET 400 MG: TABLET at 14:59

## 2023-01-05 RX ADMIN — FUROSEMIDE 40 MG: 10 INJECTION, SOLUTION INTRAVENOUS at 13:59

## 2023-01-05 RX ADMIN — POTASSIUM CHLORIDE 40 MEQ: 20 TABLET, EXTENDED RELEASE ORAL at 14:39

## 2023-01-05 RX ADMIN — SODIUM CHLORIDE: 9 INJECTION, SOLUTION INTRAVENOUS at 12:43

## 2023-01-05 RX ADMIN — PROPOFOL 120 MG: 10 INJECTION, EMULSION INTRAVENOUS at 13:58

## 2023-01-05 NOTE — ANESTHESIA PRE PROCEDURE
Department of Anesthesiology  Preprocedure Note       Name:  Ketan Angel   Age:  78 y.o.  :  1945                                          MRN:  [id number]         Date:  2023      Surgeon: * Surgery not found *    Procedure:     Medications prior to admission:   Prior to Admission medications    Medication Sig Start Date End Date Taking? Authorizing Provider   apixaban (ELIQUIS) 5 MG TABS tablet Take 5 mg by mouth 2 times daily    Historical Provider, MD   aspirin 81 MG EC tablet Take 81 mg by mouth daily    Historical Provider, MD   rosuvastatin (CRESTOR) 5 MG tablet Take 5 mg by mouth daily    Historical Provider, MD   naproxen (NAPROSYN) 500 MG tablet Take 500 mg by mouth 2 times daily (with meals)    Historical Provider, MD   atenolol (TENORMIN) 50 MG tablet 50 mg 3 times daily  18   Historical Provider, MD   omeprazole (PRILOSEC) 20 MG delayed release capsule 1 capsule 18   Historical Provider, MD   Cholecalciferol 2000 units CAPS Vitamin D3 2,000 unit tablet   Take 1 tablet every day by oral route for 90 days. Historical Provider, MD       Current medications:    Current Outpatient Medications   Medication Sig Dispense Refill    apixaban (ELIQUIS) 5 MG TABS tablet Take 5 mg by mouth 2 times daily      aspirin 81 MG EC tablet Take 81 mg by mouth daily      rosuvastatin (CRESTOR) 5 MG tablet Take 5 mg by mouth daily      naproxen (NAPROSYN) 500 MG tablet Take 500 mg by mouth 2 times daily (with meals)      atenolol (TENORMIN) 50 MG tablet 50 mg 3 times daily       omeprazole (PRILOSEC) 20 MG delayed release capsule 1 capsule      Cholecalciferol 2000 units CAPS Vitamin D3 2,000 unit tablet   Take 1 tablet every day by oral route for 90 days. No current facility-administered medications for this encounter.        Allergies:  No Known Allergies    Problem List:    Patient Active Problem List   Diagnosis Code    Acute encephalopathy G93.40       Past Medical History:  No past medical history on file. Past Surgical History:  No past surgical history on file. Social History:    Social History     Tobacco Use    Smoking status: Never    Smokeless tobacco: Current     Types: Snuff   Substance Use Topics    Alcohol use: Yes     Alcohol/week: 52.0 standard drinks     Types: 52 Cans of beer per week                                Ready to quit: Not Answered  Counseling given: Not Answered      Vital Signs (Current): There were no vitals filed for this visit. BP Readings from Last 3 Encounters:   04/20/21 (!) 147/82       NPO Status:  > 12 hours                                                                               BMI:   Wt Readings from Last 3 Encounters:   10/09/18 218 lb (98.9 kg)     There is no height or weight on file to calculate BMI.    CBC:   Lab Results   Component Value Date/Time    WBC 6.8 04/20/2021 04:37 AM    RBC 4.22 04/20/2021 04:37 AM    HGB 13.5 04/20/2021 04:37 AM    HCT 40.1 04/20/2021 04:37 AM    MCV 95.0 04/20/2021 04:37 AM    RDW 12.7 04/20/2021 04:37 AM     04/20/2021 04:37 AM       CMP:   Lab Results   Component Value Date/Time     06/21/2012 08:11 AM    K 4.5 06/21/2012 08:11 AM    CL 97 06/21/2012 08:11 AM    CO2 29 06/21/2012 08:11 AM    BUN 9 06/21/2012 08:11 AM    CREATININE 0.7 06/21/2012 08:11 AM    LABGLOM >60 06/21/2012 06:12 PM    GLUCOSE 80 06/21/2012 08:11 AM    GLUCOSE 98 04/13/2012 09:45 AM    PROT 7.2 06/21/2012 08:11 AM    CALCIUM 9.8 06/21/2012 08:11 AM    BILITOT 0.8 06/21/2012 08:11 AM    ALKPHOS 47 06/21/2012 08:11 AM    AST 39 06/21/2012 08:11 AM    ALT 35 06/21/2012 08:11 AM       POC Tests: No results for input(s): POCGLU, POCNA, POCK, POCCL, POCBUN, POCHEMO, POCHCT in the last 72 hours.     Coags: No results found for: PROTIME, INR, APTT    HCG (If Applicable): No results found for: PREGTESTUR, PREGSERUM, HCG, HCGQUANT     ABGs: No results found for: PHART, PO2ART, UVA2BSH, WNB0VRG, BEART, I1JSOYQS     Type & Screen (If Applicable):  No results found for: LABABO, LABRH    Drug/Infectious Status (If Applicable):  No results found for: HIV, HEPCAB    COVID-19 Screening (If Applicable): No results found for: COVID19        Anesthesia Evaluation  Patient summary reviewed and Nursing notes reviewed no history of anesthetic complications:   Airway: Mallampati: III  TM distance: <3 FB   Neck ROM: full  Mouth opening: < 3 FB   Dental: normal exam         Pulmonary:Negative Pulmonary ROS breath sounds clear to auscultation                             Cardiovascular:  Exercise tolerance: no interval change,   (+) hypertension:, hyperlipidemia      ECG reviewed  Rhythm: irregular  Rate: normal           Beta Blocker:  Dose within 24 Hrs         Neuro/Psych:   (+) CVA: no interval change, depression/anxiety             GI/Hepatic/Renal:   (+) GERD: well controlled,           Endo/Other:    (+) blood dyscrasia: anticoagulation therapy, arthritis:., .                 Abdominal:       Abdomen: soft. Vascular: negative vascular ROS. Other Findings:           Anesthesia Plan      MAC     ASA 3       Induction: intravenous. Anesthetic plan and risks discussed with patient. Use of blood products discussed with patient whom. Plan discussed with CRNA.                     TYRON Bustillos - PAPO   1/5/2023

## 2023-01-05 NOTE — H&P
[address] and [address] Electrophysiology  History and physical  PATIENT: [facility]  MEDICAL RECORD NUMBER: [id number]  DATE OF SERVICE:  1/5/2023  ATTENDING ELECTROPHYSIOLOGIST: Jeff Puentes MD  PRIMARY ELECTROPHYSIOLOGIST: Kamini Monroy  REFERRING PHYSICIAN:  Di Alexandra MD  CHIEF COMPLAINT: Palpitations, dizziness    HPI: This is a 78 y.o. male with a history of hypertension and paroxysmal atrial fibrillation who was seen by me in 2021 after a cerebrovascular event. He underwent a loop recorder insertion at that time which documented episodes of atrial fibrillation immediately and the patient was placed on Eliquis 5 mg twice a day in January 2022. He was seen thereafter in the office once or twice and has not returned to the office since March 2022. His loop recorder remote monitoring however revealed the presence of an episode of atrial fibrillation that had been persistent for the past month. I spoke to the patient earlier this week and since he was quite symptomatic with complaints of lightheadedness and dyspnea he was brought in for urgent cardioversion today. The patient has been compliant with the use of Eliquis twice a day. His other medications include atenolol and losartan. Patient Active Problem List    Diagnosis Date Noted    Paroxysmal atrial fibrillation ([address] 01/05/2023     Priority: Medium    Acute encephalopathy 04/19/2021       Past Medical History:   Diagnosis Date    Arthritis     Cerebral artery occlusion with cerebral infarction [facility])     Hypertension        History reviewed. No pertinent family history. Social History     Tobacco Use    Smoking status: Never    Smokeless tobacco: Current     Types: Snuff   Substance Use Topics    Alcohol use:  Yes     Alcohol/week: 52.0 standard drinks     Types: 52 Cans of beer per week       Current Outpatient Medications   Medication Sig Dispense Refill    furosemide (LASIX) 20 MG tablet Take 1 tablet by mouth daily 7 tablet 0    potassium chloride (KLOR-CON M) 10 MEQ extended release tablet Take 1 tablet by mouth daily 7 tablet 0    Magnesium Oxide 400 MG CAPS Take 1 capsule by mouth daily 90 capsule 3    Omega-3 Fatty Acids (FISH OIL) 1200 MG CPDR Take 1 capsule by mouth daily      apixaban (ELIQUIS) 5 MG TABS tablet Take 5 mg by mouth 2 times daily      aspirin 81 MG EC tablet Take 81 mg by mouth daily      rosuvastatin (CRESTOR) 5 MG tablet Take 5 mg by mouth daily      naproxen (NAPROSYN) 500 MG tablet Take 500 mg by mouth 2 times daily (with meals)      atenolol (TENORMIN) 50 MG tablet 50 mg 3 times daily       omeprazole (PRILOSEC) 20 MG delayed release capsule 1 capsule      Cholecalciferol 2000 units CAPS Vitamin D3 2,000 unit tablet   Take 1 tablet every day by oral route for 90 days. Current Facility-Administered Medications   Medication Dose Route Frequency Provider Last Rate Last Admin    magnesium oxide (MAG-OX) tablet 400 mg  400 mg Oral Daily Lorrayne Dakins, MD   400 mg at 01/05/23 1459        No Known Allergies    ROS:   Constitutional: Negative for fever, activity change and appetite change. HENT: Negative for epistaxis. Eyes: Negative for diploplia, blurred vision. Respiratory: Negative for cough, chest tightness, shortness of breath and wheezing. Cardiovascular: pertinent positives in HPI  Gastrointestinal: Negative for abdominal pain and blood in stool. All other review of systems are negative     PHYSICAL EXAM:   Vitals:    01/05/23 1255 01/05/23 1319   BP: (!) 147/123 (!) 149/86   Pulse: 87    Resp: 18    Temp: 97.6 °F (36.4 °C)    TempSrc: Temporal    SpO2: 96%    Weight: 220 lb (99.8 kg)    Height: 6' (1.829 m)       Constitutional: Well-developed, no acute distress  Eyes: conjunctivae normal, no xanthelasma   Ears, Nose, Throat: oral mucosa moist, no cyanosis   CV: no JVD. Irregular rate and rhythm. Normal S1S2 and no S3. No murmurs, rubs, or gallops.    Lungs: clear to auscultation bilaterally, normal respiratory effort without used of accessory muscles  Abdomen: soft, non-tender, bowel sounds present, no masses or hepatomegaly   Musculoskeletal: no digital clubbing, no edema   Skin: warm, no rashes     I have personally reviewed the laboratory, cardiac diagnostic and radiographic testing as outlined below:    Data:    Recent Labs     01/05/23  1250   WBC 7.0   HGB 12.9   HCT 39.1        Recent Labs     01/05/23  1250      K 4.3   CL 98   CO2 25   BUN 15   CREATININE 0.9   CALCIUM 10.3*      Lab Results   Component Value Date/Time    MG 1.7 01/05/2023 12:50 PM     No results for input(s): TSH in the last 72 hours. No results for input(s): INR in the last 72 hours. Telemetry: Atrial fibrillation with a ventricular rate of 100- 120 bpm    Stress Test:     Admit/Reg Date: 12/02/20  Ordering Dr: Vania Aguilar MD  Location: [facility]/  PCP: Sangita Stallings MD    Date of Service: 12/02/20      Order (s): MR cardiac morph fnct wo/w con    CPT Code: 80830   Report Number: [id number]  Reason for Exam: FATIGUE        **ADDENDUM**  The previous cardiac MRI study performed on December 2, 2020 was not ordered as a stress/rest perfusion study. The patient returns today for a regadenoson stress/rest myocardial perfusion imaging study. IMPRESSION:  1. Regadenoson stress/rest myocardial perfusion imaging reveals a normal study, with no evidence of fixed or reversible myocardial ischemia. 2. Since there is no evidence of infarction on rest/stress perfusion imaging, the previously noted area of gadolinium uptake along the high lateral LV wall may represent of finding other than infarction. A follow-up study is recommended in 6 months to further assess this lesion. Addendum Dictated By: Lauren Nickerson MD  Addendum Signed By: Lauren Nickerson MD   24/55/19 9940      I have independently reviewed all of the ECGs and rhythm strips per above Assessment/Plan: This is a 78 y.o. male with a history of     1. Persistent atrial fibrillation--on anticoagulation with Eliquis for the past year  2. Hypertension  3. Loop recorder in situ    Recommendations:  Proceed with cardioversion today  Follow-up in the office in 2 to 4 weeks    I have spent a total of all of the above was discussed with the patient and his wife reviewing the above stated recommendations. And a total of >50% of that time involved face-to-face time providing counseling and or coordination of care with the other providers, reviewing records/tests, counseling/education of the patient, ordering medications/tests/procedures, coordinating care, and documenting clinical information in the EHR.      Debbie Encarnacion MD  Cardiac Electrophysiology  [facility]) Physicians  The Heart and Vascular [place]: Tim [facility]lin Electrophysiology  2:59 PM  1/5/2023

## 2023-01-05 NOTE — DISCHARGE INSTRUCTIONS
Discharge Date:  1/5/2023   Discharged To: home with support    Resume Activity:  Bathing:   Tub Yes   Shower Yes  Walking:Yes  Stairs: Yes  Driving: Yes,tomorrow  Work: Yes  School: NA  Sexual Activity: Yes  Lifting: Yes    Special Instructions: Call for any questions    Diet: No added salt. Tobacco Cessation Counseling: Done. Office Follow Up: Call for appointment in 2-4 weeks.     Office Number [phone]

## 2023-01-05 NOTE — ANESTHESIA POSTPROCEDURE EVALUATION
Department of Anesthesiology  Postprocedure Note    Patient: Kimberli Zavaleta  MRN: [id number]  YOB: 1945  Date of evaluation: 1/5/2023      Procedure Summary     Date: 01/05/23 Room / Location: [facility] CATH LAB    Anesthesia Start: 2485 Anesthesia Stop: 6115    Procedure: CARDIOVERSION WITH ANESTHESIA Diagnosis:       Presence of other cardiac implants and grafts      Cerebral infarction, unspecified    Scheduled Providers:  Responsible Provider: Warden Mandy MD    Anesthesia Type: MAC ASA Status: 3          Anesthesia Type: No value filed.     Jalil Phase I:      Jalil Phase II:        Anesthesia Post Evaluation    Patient location during evaluation: PACU  Patient participation: complete - patient participated  Level of consciousness: awake and alert  Airway patency: patent  Nausea & Vomiting: no nausea and no vomiting  Complications: no  Cardiovascular status: blood pressure returned to baseline and hemodynamically stable  Respiratory status: acceptable and spontaneous ventilation  Hydration status: euvolemic  Multimodal analgesia pain management approach

## 2023-01-05 NOTE — OP NOTE
[facility]) Physicians- The Heart and [address] Electrophysiology  Procedure Report  PATIENT: Monica Quintanilla  MEDICAL RECORD NUMBER: [id number]  DATE OF PROCEDURE:  1/5/2023  ATTENDING ELECTROPHYSIOLOGIST:  Mirna Barnhart MD      PROCECURE:    1. Direct Current Electrical Cardioversion    INDICATION:  Persistent atrial fibrillation    PROCEDURE PERFORMED By: Mirna Barnhart MD    PROCEDURE TIME: 2:30 PM    COMPICATIONS: None immediately apparent    ANESTHESIA: LMAC    DESCRIPTION OF PROCEDURE: The risks, benefits, and alternatives to the procedure were discussed with the patient, and informed consent was obtained. The patient was brought to the cardiovascular lab and sedated under the guidance of anesthesia. Once anesthesia was deemed adequate, a 200 J biphasic synchronous shock was applied to restore sinus rhythm. The patient was then allowed to wake in the usual fashion. Comment: The patient was therapeutically anticoagulated for a minimum of 3 consecutive weeks prior to cardioversion. SUMMARY:  1. Successful cardioversion of persistent atrial fibrillation to sinus rhythm. RECOMMENDATIONS:  1. Discharge to home when fully awake and alert, if clinically stable. 2. Resume all pre-procedure medications, including anticoagulation.   3. Follow-up in the office in 2-4 weeks    Mirna Barnhart MD  Cardiac Electrophysiology  [address] [facility]  The Heart and Vascular [place]: Brendan Choi Electrophysiology  3:00 PM  1/5/2023

## 2023-01-06 ENCOUNTER — TELEPHONE (OUTPATIENT)
Dept: CARDIOLOGY CLINIC | Age: 78
End: 2023-01-06

## 2023-01-06 NOTE — TELEPHONE ENCOUNTER
----- Message from Darrell Henderson MD sent at 1/5/2023  2:56 PM EST -----  Needs an appointment in the office in 2-4 weeks. s/p cardioversion today.

## 2023-01-25 ENCOUNTER — OFFICE VISIT (OUTPATIENT)
Dept: NON INVASIVE DIAGNOSTICS | Age: 78
End: 2023-01-25
Payer: MEDICARE

## 2023-01-25 VITALS
BODY MASS INDEX: 29.66 KG/M2 | RESPIRATION RATE: 16 BRPM | SYSTOLIC BLOOD PRESSURE: 132 MMHG | HEART RATE: 73 BPM | HEIGHT: 72 IN | WEIGHT: 219 LBS | DIASTOLIC BLOOD PRESSURE: 68 MMHG

## 2023-01-25 DIAGNOSIS — I48.0 PAROXYSMAL ATRIAL FIBRILLATION (HCC): ICD-10-CM

## 2023-01-25 DIAGNOSIS — Z95.818 PRESENCE OF CARDIAC DEVICE: Primary | ICD-10-CM

## 2023-01-25 PROCEDURE — 99214 OFFICE O/P EST MOD 30 MIN: CPT | Performed by: INTERNAL MEDICINE

## 2023-01-25 PROCEDURE — 1123F ACP DISCUSS/DSCN MKR DOCD: CPT | Performed by: INTERNAL MEDICINE

## 2023-01-25 PROCEDURE — 93285 PRGRMG DEV EVAL SCRMS IP: CPT | Performed by: INTERNAL MEDICINE

## 2023-01-25 RX ORDER — ATENOLOL 50 MG/1
50 TABLET ORAL 2 TIMES DAILY WITH MEALS
Qty: 30 TABLET | Status: SHIPPED | COMMUNITY
Start: 2023-01-25

## 2023-01-25 RX ORDER — LOSARTAN POTASSIUM 50 MG/1
50 TABLET ORAL NIGHTLY
Qty: 90 TABLET | Refills: 3 | Status: SHIPPED | OUTPATIENT
Start: 2023-01-25

## 2023-01-25 RX ORDER — LOSARTAN POTASSIUM 25 MG/1
25 TABLET ORAL DAILY
COMMUNITY
End: 2023-01-25

## 2023-01-25 NOTE — PROGRESS NOTES
[address] and [address] Electrophysiology  History and physical  PATIENT: Tyson Henriquez  MEDICAL RECORD NUMBER: [id number]  DATE OF SERVICE:  1/25/2023  ATTENDING ELECTROPHYSIOLOGIST: Darrell Henderson MD  PRIMARY ELECTROPHYSIOLOGIST: Criselda Rodriguez  REFERRING PHYSICIAN:  Jeffry Wesley MD  CHIEF COMPLAINT: Palpitations, dizziness    HPI: This is a 78 y.o. male with a history of hypertension and paroxysmal atrial fibrillation who was seen by me in 2021 after a cerebrovascular event. He underwent a loop recorder insertion at that time which documented episodes of atrial fibrillation immediately and the patient was placed on Eliquis 5 mg twice a day in January 2022. He was seen thereafter in the office once or twice and has not returned to the office since March 2022. His loop recorder remote monitoring however revealed the presence of an episode of atrial fibrillation that was persistent for 1 month and the patient underwent cardioversion on January 5. The patient has been compliant with the use of Eliquis twice a day. His other medications include atenolol and losartan. Still has dyspnea with moderate exertion such as walking up steps. Sometimes associated with lightheadedness. No syncope. Patient Active Problem List    Diagnosis Date Noted    Paroxysmal atrial fibrillation ([address] 01/05/2023     Priority: Medium    Acute encephalopathy 04/19/2021       Past Medical History:   Diagnosis Date    Arthritis     Cerebral artery occlusion with cerebral infarction [facility])     Hypertension        History reviewed. No pertinent family history. Social History     Tobacco Use    Smoking status: Never    Smokeless tobacco: Current     Types: Snuff   Substance Use Topics    Alcohol use:  Yes     Alcohol/week: 52.0 standard drinks     Types: 52 Cans of beer per week       Current Outpatient Medications   Medication Sig Dispense Refill    losartan (COZAAR) 25 MG tablet Take 25 mg by mouth daily      atenolol (TENORMIN) 50 MG tablet 1 tablet with breakfast and with evening meal 30 tablet     Omega-3 Fatty Acids (FISH OIL) 1200 MG CPDR Take 1 capsule by mouth daily      Magnesium Oxide 400 MG CAPS Take 1 capsule by mouth daily 90 capsule 3    apixaban (ELIQUIS) 5 MG TABS tablet Take 5 mg by mouth 2 times daily      aspirin 81 MG EC tablet Take 81 mg by mouth daily      omeprazole (PRILOSEC) 20 MG delayed release capsule 1 capsule      Cholecalciferol 2000 units CAPS Vitamin D3 2,000 unit tablet   Take 1 tablet every day by oral route for 90 days. No current facility-administered medications for this visit. No Known Allergies    ROS:   Constitutional: Negative for fever, activity change and appetite change. HENT: Negative for epistaxis. Eyes: Negative for diploplia, blurred vision. Respiratory: Negative for cough, chest tightness, shortness of breath and wheezing. Cardiovascular: pertinent positives in HPI  Gastrointestinal: Negative for abdominal pain and blood in stool. All other review of systems are negative     PHYSICAL EXAM:   Vitals:    01/25/23 1141   BP: 132/68   Pulse: 73   Resp: 16   Weight: 219 lb (99.3 kg)   Height: 6' (1.829 m)        Constitutional: Well-developed, no acute distress  Eyes: conjunctivae normal, no xanthelasma   Ears, Nose, Throat: oral mucosa moist, no cyanosis   CV: no JVD. Irregular rate and rhythm. Normal S1S2 and no S3. No murmurs, rubs, or gallops.    Lungs: clear to auscultation bilaterally, normal respiratory effort without used of accessory muscles  Abdomen: soft, non-tender, bowel sounds present, no masses or hepatomegaly   Musculoskeletal: no digital clubbing, no edema   Skin: warm, no rashes     I have personally reviewed the laboratory, cardiac diagnostic and radiographic testing as outlined below:    Data:           Lab Results   Component Value Date/Time    MG 1.7 01/05/2023 12:50 PM     Telemetry: 1/25/23 NSR    Stress Test: Admit/Reg Date: 12/02/20  Ordering Dr: Darlene Ramos MD  [facility]  PCP: Evgeny Rodriguez MD    Date of Service: 12/02/20      Order (s): MR paola becker fnct wo/w con    CPT Code: 23584   Report Number: [id number]  Reason for Exam: FATIGUE        **ADDENDUM**  The previous cardiac MRI study performed on December 2, 2020 was not ordered as a stress/rest perfusion study. The patient returns today for a regadenoson stress/rest myocardial perfusion imaging study. IMPRESSION:  1. Regadenoson stress/rest myocardial perfusion imaging reveals a normal study, with no evidence of fixed or reversible myocardial ischemia. 2. Since there is no evidence of infarction on rest/stress perfusion imaging, the previously noted area of gadolinium uptake along the high lateral LV wall may represent of finding other than infarction. A follow-up study is recommended in 6 months to further assess this lesion. Addendum Dictated By: Jennifer Schroeder. Jolly Nur MD  Addendum Signed By: Jennifer Schroeder. Jolly Nur MD   95/81/91 7133      ILR Interrogation    Battery --40%  No AF since cardioversion on 1/5/23  R waves --0.52mV      I have independently reviewed all of the ECGs and rhythm strips per above     Assessment/Plan: This is a 78 y.o. male with a history of     1. Paroxysmal atrial fibrillation--on anticoagulation with Eliquis for the past year  2. Hypertension--fairly well controlled today but his dyspnea on exertion may be related to uncontrolled hypertension with activity  3. Excessive alcohol use--the patient says that he has reduced his intake after discussion with me post cardioversion  4.   Loop recorder in situ--appropriate function as noted    Recommendations:    Increase losartan to 50 mg daily  Continue atenolol twice a day along with Eliquis  Continue remote monitoring monthly  Echocardiography in 2 weeks to assess atrial sizes and LV function  Okay to proceed with knee surgery  Follow-up in the office with me in 6 months or as needed based on his symptoms    All of the above was discussed with the patient and his wife reviewing the above stated recommendations. And a total of >50% of that time involved face-to-face time providing counseling and or coordination of care with the other providers, reviewing records/tests, counseling/education of the patient, ordering medications/tests/procedures, coordinating care, and documenting clinical information in the EHR.      Ivan Cates MD  Cardiac Electrophysiology  [facility]) Physicians  The Heart and Vascular [place]: Abad Ross Electrophysiology  12:12 PM  1/25/2023

## 2023-02-02 ENCOUNTER — TELEPHONE (OUTPATIENT)
Dept: CARDIOLOGY | Age: 78
End: 2023-02-02

## 2023-02-03 ENCOUNTER — HOSPITAL ENCOUNTER (OUTPATIENT)
Dept: CARDIOLOGY | Age: 78
Discharge: HOME | End: 2023-02-03
Payer: MEDICARE

## 2023-02-03 DIAGNOSIS — I48.0 PAROXYSMAL ATRIAL FIBRILLATION (HCC): ICD-10-CM

## 2023-02-03 LAB
LV EF: 58 %
LVEF MODALITY: NORMAL

## 2023-02-03 PROCEDURE — 93306 TTE W/DOPPLER COMPLETE: CPT

## 2023-02-20 PROBLEM — G93.40 ACUTE ENCEPHALOPATHY: Status: RESOLVED | Noted: 2021-04-19 | Resolved: 2023-02-20

## 2023-02-21 ENCOUNTER — OFFICE VISIT (OUTPATIENT)
Dept: CARDIOLOGY CLINIC | Age: 78
End: 2023-02-21
Payer: MEDICARE

## 2023-02-21 ENCOUNTER — HOSPITAL ENCOUNTER (OUTPATIENT)
Age: 78
Discharge: HOME | End: 2023-02-23

## 2023-02-21 VITALS
DIASTOLIC BLOOD PRESSURE: 80 MMHG | HEART RATE: 64 BPM | WEIGHT: 220.6 LBS | SYSTOLIC BLOOD PRESSURE: 153 MMHG | RESPIRATION RATE: 16 BRPM | BODY MASS INDEX: 29.88 KG/M2 | HEIGHT: 72 IN

## 2023-02-21 DIAGNOSIS — M25.569 KNEE PAIN, UNSPECIFIED CHRONICITY, UNSPECIFIED LATERALITY: ICD-10-CM

## 2023-02-21 DIAGNOSIS — Z01.810 PREOP CARDIOVASCULAR EXAM: Primary | ICD-10-CM

## 2023-02-21 PROBLEM — I10 HTN (HYPERTENSION): Status: ACTIVE | Noted: 2023-02-21

## 2023-02-21 LAB
ABO/RH: NORMAL
ANTIBODY SCREEN: NORMAL

## 2023-02-21 PROCEDURE — 1123F ACP DISCUSS/DSCN MKR DOCD: CPT | Performed by: INTERNAL MEDICINE

## 2023-02-21 PROCEDURE — 86901 BLOOD TYPING SEROLOGIC RH(D): CPT

## 2023-02-21 PROCEDURE — 86900 BLOOD TYPING SEROLOGIC ABO: CPT

## 2023-02-21 PROCEDURE — 93000 ELECTROCARDIOGRAM COMPLETE: CPT | Performed by: INTERNAL MEDICINE

## 2023-02-21 PROCEDURE — 3079F DIAST BP 80-89 MM HG: CPT | Performed by: INTERNAL MEDICINE

## 2023-02-21 PROCEDURE — 87081 CULTURE SCREEN ONLY: CPT

## 2023-02-21 PROCEDURE — 99204 OFFICE O/P NEW MOD 45 MIN: CPT | Performed by: INTERNAL MEDICINE

## 2023-02-21 PROCEDURE — 86850 RBC ANTIBODY SCREEN: CPT

## 2023-02-21 PROCEDURE — 3077F SYST BP >= 140 MM HG: CPT | Performed by: INTERNAL MEDICINE

## 2023-02-21 RX ORDER — CETIRIZINE HYDROCHLORIDE 10 MG/1
10 TABLET ORAL DAILY
COMMUNITY

## 2023-02-21 NOTE — PROGRESS NOTES
Chief Complaint   Patient presents with    Cardiac Clearance       Patient Active Problem List    Diagnosis Date Noted    HTN (hypertension) 02/21/2023    Paroxysmal atrial fibrillation (HCC) 01/05/2023       Current Outpatient Medications   Medication Sig Dispense Refill    cetirizine (ZYRTEC) 10 MG tablet Take 10 mg by mouth daily      atenolol (TENORMIN) 50 MG tablet 50 mg in the morning and at bedtime 30 tablet     losartan (COZAAR) 50 MG tablet Take 1 tablet by mouth nightly 90 tablet 3    Omega-3 Fatty Acids (FISH OIL) 1200 MG CPDR Take 1 capsule by mouth daily      apixaban (ELIQUIS) 5 MG TABS tablet Take 5 mg by mouth 2 times daily      aspirin 81 MG EC tablet Take 81 mg by mouth daily      omeprazole (PRILOSEC) 20 MG delayed release capsule 1 capsule      Cholecalciferol 2000 units CAPS Vitamin D3 2,000 unit tablet   Take 1 tablet every day by oral route for 90 days. No current facility-administered medications for this visit. No Known Allergies    Vitals:    02/21/23 0831   BP: (!) 153/80   Pulse: 64   Resp: 16   Weight: 220 lb 9.6 oz (100.1 kg)   Height: 6' (1.829 m)             SUBJECTIVE: James Jerome presents to the office today for consult for pre-op evaluation prior to knee surgery with Dr. Marky Bass . DR Lisa Vickers PATIENT - I reviewed all records, noting a DCCV 1/5/2023 and an normal TTE recently   He complains of no new or unstable cardiac symptoms,  and denies dyspnea, exertional chest pressure/discomfort, orthopnea, and syncope  Does routine chores around the house, can climb stairs,  sticks, carry trash etc  Compliant with NOAC. Physical Exam   BP (!) 153/80   Pulse 64   Resp 16   Ht 6' (1.829 m)   Wt 220 lb 9.6 oz (100.1 kg)   BMI 29.92 kg/m²   Constitutional: Oriented to person, place, and time. Obese No distress. Head: Normocephalic and atraumatic. Neck: No carotid bruit, no JVD present.   Cardiovascular: Normal rate, regular rhythm, normal heart sounds and intact distal pulses. No murmur heard. Pulmonary/Chest: Effort normal and breath sounds normal.    Abdominal: Soft. Bowel sounds are normal.  Musculoskeletal: No edema   Neurological: Alert and oriented to person, place, and time. Skin: Skin is warm and dry. Psychiatric: Normal mood and affect. Behavior is normal.     EKG:  normal sinus rhythm, RBBB, unchanged from previous tracings. ASSESSMENT AND PLAN:  Patient Active Problem List   Diagnosis    Paroxysmal atrial fibrillation (HCC)    HTN (hypertension)        Patient has no high risk clinical predictors of an adverse outcome in surgery, such as recent myocardial infarction, unstable angina, heart failure, rhythm other than sinus, severe obstructive valvular disease,cva, insulin, ckd  Able to achieve 4 METS with AODLs  Normal CV exam and ekg no change - no recurrent  AF  RCRI Class I risk (< 1% chance of cardiac complication).        Samira Patel M.D  [facility]

## 2023-02-23 LAB — MRSA CULTURE ONLY: NORMAL

## 2023-02-28 ENCOUNTER — HOSPITAL ENCOUNTER (OUTPATIENT)
Age: 78
Discharge: HOME | End: 2023-03-02

## 2023-02-28 LAB
ANION GAP SERPL CALCULATED.3IONS-SCNC: 7 MMOL/L (ref 7–16)
BUN BLDV-MCNC: 14 MG/DL (ref 6–23)
CALCIUM SERPL-MCNC: 8.5 MG/DL (ref 8.6–10.2)
CHLORIDE BLD-SCNC: 97 MMOL/L (ref 98–107)
CO2: 24 MMOL/L (ref 22–29)
CREAT SERPL-MCNC: 0.7 MG/DL (ref 0.7–1.2)
GFR SERPL CREATININE-BSD FRML MDRD: >60 ML/MIN/1.73
GLUCOSE BLD-MCNC: 159 MG/DL (ref 74–99)
HCT VFR BLD CALC: 33.2 % (ref 37–54)
HEMOGLOBIN: 11.2 G/DL (ref 12.5–16.5)
POTASSIUM SERPL-SCNC: 4.2 MMOL/L (ref 3.5–5)
SODIUM BLD-SCNC: 128 MMOL/L (ref 132–146)

## 2023-02-28 PROCEDURE — 85018 HEMOGLOBIN: CPT

## 2023-02-28 PROCEDURE — 80048 BASIC METABOLIC PNL TOTAL CA: CPT

## 2023-02-28 PROCEDURE — 85014 HEMATOCRIT: CPT
